# Patient Record
Sex: MALE | Race: WHITE
[De-identification: names, ages, dates, MRNs, and addresses within clinical notes are randomized per-mention and may not be internally consistent; named-entity substitution may affect disease eponyms.]

---

## 2019-07-30 ENCOUNTER — TRANSCRIPTION ENCOUNTER (OUTPATIENT)
Age: 29
End: 2019-07-30

## 2019-07-30 ENCOUNTER — APPOINTMENT (OUTPATIENT)
Dept: COLORECTAL SURGERY | Facility: CLINIC | Age: 29
End: 2019-07-30
Payer: COMMERCIAL

## 2019-07-30 VITALS — SYSTOLIC BLOOD PRESSURE: 138 MMHG | HEART RATE: 79 BPM | DIASTOLIC BLOOD PRESSURE: 83 MMHG | TEMPERATURE: 98 F

## 2019-07-30 DIAGNOSIS — Z72.3 LACK OF PHYSICAL EXERCISE: ICD-10-CM

## 2019-07-30 DIAGNOSIS — K62.89 OTHER SPECIFIED DISEASES OF ANUS AND RECTUM: ICD-10-CM

## 2019-07-30 PROBLEM — Z00.00 ENCOUNTER FOR PREVENTIVE HEALTH EXAMINATION: Status: ACTIVE | Noted: 2019-07-30

## 2019-07-30 PROCEDURE — 46600 DIAGNOSTIC ANOSCOPY SPX: CPT

## 2019-07-30 PROCEDURE — 99202 OFFICE O/P NEW SF 15 MIN: CPT | Mod: 25

## 2019-07-30 RX ORDER — FINASTERIDE 1 MG/1
1 TABLET ORAL
Refills: 0 | Status: ACTIVE | COMMUNITY

## 2019-07-31 NOTE — CONSULT LETTER
[Dear  ___] : Dear  [unfilled], [Consult Letter:] : I had the pleasure of evaluating your patient, [unfilled]. [( Thank you for referring [unfilled] for consultation for _____ )] : Thank you for referring [unfilled] for consultation for [unfilled] [Please see my note below.] : Please see my note below. [Consult Closing:] : Thank you very much for allowing me to participate in the care of this patient.  If you have any questions, please do not hesitate to contact me. [Sincerely,] : Sincerely, [FreeTextEntry2] : IKER CRUZ\par [address] [place] [address] [address], \[place] [address]\par  [FreeTextEntry3] : BLAIR SARMIENTO MD

## 2019-07-31 NOTE — ASSESSMENT
[FreeTextEntry1] : Exam findings were discussed at length with patient.\par STI testing performed, f/u results, possible further examination with anoscopy/biopsy vs EUA pending results\par All questions were answered, patient expressed understanding, and is agreeable to this plan.\par \par

## 2019-07-31 NOTE — PHYSICAL EXAM
[FreeTextEntry1] :  Medical assistant present for duration of physical examination\par \par Gen NAD, AOx3\par Abdomen soft\par Anorectal Exam:\par Inspection no erythema, induration or fluctuance, no skin excoriation, no fissure, minimal external hemorrhoids nonthrombosed\par STI swabs performed\par [place] mildly tender, no masses palpated, no blood on gloved finger\par Anoscopy no fissure, friable anterior mucosa ?distal focal proctitis, with mucopurulent/blood tinged discharge\par \par

## 2019-07-31 NOTE — HISTORY OF PRESENT ILLNESS
[FreeTextEntry1] : 27 y/o M presents for evaluation of hemorrhoids and possible rectal ulcer, referred by Dr. Gaston\par Last week on Thursday night, patient began to have pain, pressure, bleeding on the TP. Night before had chills and muscle aches, since subsides\par Denies drainage or fevers\par Continues to have BRP on the TP with every BM\par No previous episodes of similar pain or h/o hemorrhoids\par BH: daily normally but since symptoms began going every other day\par Admits to straining currently with BM's\par Reports adequate dietary fiber and water intake\par Currently taking 3 tabs of Colace daily since symptoms began \par Has tried Prep H ointment with no improvement, using Tylenol PRN for pain \par Never had a colonoscopy\par (+) anal receptive sex \par No ASA/NSAIDs last 7 days

## 2019-08-05 LAB
C TRACH RRNA SPEC QL NAA+PROBE: NOT DETECTED
HHV SPEC CULT: NORMAL
N GONORRHOEA RRNA SPEC QL NAA+PROBE: NOT DETECTED
SOURCE AMPLIFICATION: NORMAL